# Patient Record
Sex: FEMALE | Race: WHITE | Employment: FULL TIME | URBAN - METROPOLITAN AREA
[De-identification: names, ages, dates, MRNs, and addresses within clinical notes are randomized per-mention and may not be internally consistent; named-entity substitution may affect disease eponyms.]

---

## 2017-01-24 RX ORDER — LEVOTHYROXINE SODIUM 175 UG/1
TABLET ORAL
Qty: 30 TABLET | Refills: 5 | Status: SHIPPED | OUTPATIENT
Start: 2017-01-24 | End: 2017-04-14 | Stop reason: SDUPTHER

## 2017-04-14 RX ORDER — LEVOTHYROXINE SODIUM 175 UG/1
175 TABLET ORAL DAILY
Qty: 90 TABLET | Refills: 0 | Status: SHIPPED | OUTPATIENT
Start: 2017-04-14 | End: 2017-06-12 | Stop reason: SDUPTHER

## 2017-04-24 ENCOUNTER — HOSPITAL ENCOUNTER (OUTPATIENT)
Dept: NUCLEAR MEDICINE | Age: 37
Discharge: HOME | End: 2017-04-24
Attending: NURSE PRACTITIONER | Admitting: NURSE PRACTITIONER

## 2017-04-24 VITALS — BODY MASS INDEX: 40.1 KG/M2 | WEIGHT: 241 LBS

## 2017-04-24 DIAGNOSIS — R10.11 RIGHT UPPER QUADRANT PAIN: ICD-10-CM

## 2017-04-24 DIAGNOSIS — R10.11 ABDOMINAL PAIN, RIGHT UPPER QUADRANT: ICD-10-CM

## 2017-04-24 RX ORDER — 0.9 % SODIUM CHLORIDE 0.9 %
10 VIAL (ML) INJECTION
Status: COMPLETED | OUTPATIENT
Start: 2017-04-24 | End: 2017-04-24

## 2017-04-24 RX ORDER — SODIUM CHLORIDE 9 MG/ML
INJECTION, SOLUTION INTRAVENOUS CONTINUOUS
Status: CANCELLED | OUTPATIENT
Start: 2017-04-24

## 2017-04-24 RX ORDER — SODIUM CHLORIDE 9 MG/ML
INJECTION, SOLUTION INTRAVENOUS CONTINUOUS
Status: DISCONTINUED | OUTPATIENT
Start: 2017-04-24 | End: 2017-04-29 | Stop reason: HOSPADM

## 2017-04-24 RX ADMIN — Medication 6 MILLICURIE: at 10:34

## 2017-04-24 RX ADMIN — SODIUM CHLORIDE: 9 INJECTION, SOLUTION INTRAVENOUS at 12:42

## 2017-04-24 RX ADMIN — Medication 10 ML: at 12:41

## 2017-06-12 RX ORDER — LEVOTHYROXINE SODIUM 175 UG/1
175 TABLET ORAL DAILY
Qty: 90 TABLET | Refills: 3 | Status: SHIPPED | OUTPATIENT
Start: 2017-06-12 | End: 2017-11-13 | Stop reason: SDUPTHER

## 2017-06-29 ENCOUNTER — TELEPHONE (OUTPATIENT)
Dept: ENDOCRINOLOGY | Age: 37
End: 2017-06-29

## 2017-06-29 ENCOUNTER — OFFICE VISIT (OUTPATIENT)
Dept: ENDOCRINOLOGY | Age: 37
End: 2017-06-29

## 2017-06-29 VITALS
SYSTOLIC BLOOD PRESSURE: 120 MMHG | BODY MASS INDEX: 40.39 KG/M2 | RESPIRATION RATE: 16 BRPM | WEIGHT: 242.4 LBS | HEART RATE: 78 BPM | HEIGHT: 65 IN | DIASTOLIC BLOOD PRESSURE: 72 MMHG

## 2017-06-29 DIAGNOSIS — E03.8 OTHER SPECIFIED HYPOTHYROIDISM: Primary | ICD-10-CM

## 2017-06-29 PROCEDURE — 99213 OFFICE O/P EST LOW 20 MIN: CPT | Performed by: INTERNAL MEDICINE

## 2017-11-13 RX ORDER — LEVOTHYROXINE SODIUM 175 UG/1
175 TABLET ORAL DAILY
Qty: 90 TABLET | Refills: 3 | Status: SHIPPED | OUTPATIENT
Start: 2017-11-13 | End: 2018-12-17 | Stop reason: SDUPTHER

## 2017-12-19 ENCOUNTER — OFFICE VISIT (OUTPATIENT)
Dept: DERMATOLOGY | Age: 37
End: 2017-12-19

## 2017-12-19 DIAGNOSIS — D22.5 IRRITATED NEVUS OF BACK: ICD-10-CM

## 2017-12-19 DIAGNOSIS — L81.4 SOLAR LENTIGO: ICD-10-CM

## 2017-12-19 DIAGNOSIS — D22.30 NEVUS OF FACE: Primary | ICD-10-CM

## 2017-12-19 DIAGNOSIS — D23.5 DERMAL NEVUS OF CHEST: ICD-10-CM

## 2017-12-19 PROCEDURE — 99202 OFFICE O/P NEW SF 15 MIN: CPT | Performed by: DERMATOLOGY

## 2017-12-19 NOTE — PROGRESS NOTES
[facility]  Geovanna Mast MD  [phone]      Nnamdi Garrett  1980    40 y.o. female     Date of Visit: 12/19/2017    Chief Complaint: skin moles    History of Present Illness:    1, 2. She presents today for a nevus on the forehead and chest that she would like to have removed. 3.  She also complains of an irritated lesion on the back that she would like to have removed. 4.  She complains of a persistent pigmented lesion on the left cheek. Review of Systems:  Skin: no rash. Past Medical History, Family History, Surgical History, Medications and Allergies reviewed. Past Medical History:   Diagnosis Date    Diabetes mellitus ([address]     gestational diabetic diet controlled    Hypothyroidism 2009    Mental disorder     anxiety     Past Surgical History:   Procedure Laterality Date    WISDOM TOOTH EXTRACTION         No Known Allergies  Outpatient Prescriptions Marked as Taking for the 12/19/17 encounter (Office Visit) with Rosina Curiel MD   Medication Sig Dispense Refill    levothyroxine (SYNTHROID) 175 MCG tablet Take 1 tablet by mouth daily 90 tablet 3         Physical Examination       The following were examined and determined to be normal: Psych/Neuro, Scalp/hair, Head/face, Conjunctivae/eyelids, Gums/teeth/lips, Neck, Breast/axilla/chest, Abdomen, RUE and LUE. The following were examined and determined to be abnormal: Back. Well appearing. 1.  Central upper forehead with a 7 mm round smooth brown papule with a cobblestone surface. 2.  Right upper chest with a 6 mm round smooth brown papule with a cobblestone surface. 3.  Left upper back with a 1 cm oval-shaped pink brown plaque. 4.  Left central cheek with an oval-shaped smooth light brown patch. Assessment and Plan     1. Nevus of face     Return for shave removaldiscussed risk of scar and also out-of-pocket expense.        2. Dermal nevus of chest     Return for shave removaldiscussed risk of scar and also out-of-pocket expense. 3. Irritated nevus of back -      Return for shave removal.  Discussed risk of scar. 4. Solar lentigo     Encouraged sun protective behaviors. Observe.

## 2018-01-19 ENCOUNTER — PROCEDURE VISIT (OUTPATIENT)
Dept: DERMATOLOGY | Age: 38
End: 2018-01-19

## 2018-01-19 DIAGNOSIS — D23.5 DERMAL NEVUS OF CHEST: ICD-10-CM

## 2018-01-19 DIAGNOSIS — D23.30 DERMAL NEVUS OF FACE: Primary | ICD-10-CM

## 2018-01-19 DIAGNOSIS — D22.5 IRRITATED NEVUS OF BACK: ICD-10-CM

## 2018-01-19 PROCEDURE — MISCSHVSIM COSMETIC SHAVE REMOVAL OF BENIGN LESION - SIMPLE: Performed by: DERMATOLOGY

## 2018-01-19 PROCEDURE — 11301 SHAVE SKIN LESION 0.6-1.0 CM: CPT | Performed by: DERMATOLOGY

## 2018-01-19 NOTE — PATIENT INSTRUCTIONS
For your well being, we encourage you to stop smoking or using tobacco products. Smoking and the use of other tobacco products, including cigars and smokeless tobacco, causes or worsens numerous diseases and conditions. Some products also expose nearby people to toxic secondhand smoke. Smoking is the leading cause of preventable death in the [place] causing over 438,000 deaths per year. Secondhand smoke is a serious health hazard for people of all ages, causing more than 41,000 deaths each year. Marijuana smoke contains many of the same toxins, irritants and carcinogens as tobacco smoke. Electronic cigarettes are a new tobacco product, and the potential health consequences and safety of these products are unknown. Smokeless Tobacco products are a known cause of cancer, and are not a safe alternative to cigarettes. 1. Make the decision to quit smoking  Stopping smoking is the best thing you can do for your health. If you smoke, you are more likely to get diseases of the lungs, heart, and brain. You are also more likely to get many kinds of cancer. After you quit, you will be less likely to get these diseases. Stopping smoking is hardbut you can do it! Your doctor can help you. 2. Get ready to quit  Once you decide to quit, make a plan with the help of your doctor. Here are some things you need to do:  Choose a day to quit. Talk to your primary care doctor about using the nicotine patch, gum, inhaler, or nasal spray to help you quit smoking. Getting nicotine some way other than in a cigarette can help make quitting easier. Talk to your primary care doctor about using a prescription medicine like bupropion (brand name: Zyban) to reduce your urge to smoke. If you decide to use a medicine, start taking it two weeks before your quit day. Talk with your primary care doctor about when you smoke. For example, you may smoke first thing in the morning, after a meal, or when you feel stressed.  Plan what you will do instead of smoking. Tell your family and friends that you are going to try to quit and on what date. Put together a list of phone numbers of friends and family members who can give you support when you feel you might break down and have a cigarette. Before your quit day, put all tobacco products, ashtrays, and lighters away. 3. Put your plan into action  When you wake up on your quit day, start using a nicotine replacement method if you had planned to do that. For the first few days after you quit, you may have some signs of nicotine withdrawal. You may feel restless and cranky. You may find it hard to think. You might need to change your dose of nicotine if these signs upset you. Do not smoke! If you feel like you want to smoke, call a friend or family member who has agreed to help you. Also, there might be someone in your doctor's office you can call. Put into action your plans for doing things other than smoking. For example, when you feel the urge to smoke, you might take a walk. Or, you might visit friends who do not smoke. It is best to stay away from places where you used to smoke. 4. If you return to smoking  Quitting smoking is not easy. Many people have to try several times before they succeed. If you start smoking again, call your primary care doctor's office soon to talk about what happened. Think about what you can do to keep from smoking when you try to quit again. Part of this handout is provided by the [employer]. More information is available at the American Lung Association https://romero.com/.  This information provides a general overview and may not apply to everyone. Talk to your primary care doctor to find out if this information applies to you and to get more information on this subject.

## 2018-01-24 ENCOUNTER — TELEPHONE (OUTPATIENT)
Dept: DERMATOLOGY | Age: 38
End: 2018-01-24

## 2018-08-23 ENCOUNTER — OFFICE VISIT (OUTPATIENT)
Dept: ENDOCRINOLOGY | Age: 38
End: 2018-08-23

## 2018-08-23 VITALS
HEIGHT: 64 IN | HEART RATE: 81 BPM | BODY MASS INDEX: 40.39 KG/M2 | RESPIRATION RATE: 16 BRPM | WEIGHT: 236.6 LBS | DIASTOLIC BLOOD PRESSURE: 62 MMHG | SYSTOLIC BLOOD PRESSURE: 108 MMHG | OXYGEN SATURATION: 97 %

## 2018-08-23 DIAGNOSIS — E03.9 ACQUIRED HYPOTHYROIDISM: Primary | ICD-10-CM

## 2018-08-23 DIAGNOSIS — E03.9 ACQUIRED HYPOTHYROIDISM: ICD-10-CM

## 2018-08-23 LAB — TSH REFLEX: 0.66 UIU/ML (ref 0.27–4.2)

## 2018-08-23 PROCEDURE — 99213 OFFICE O/P EST LOW 20 MIN: CPT | Performed by: INTERNAL MEDICINE

## 2018-08-23 RX ORDER — TRAZODONE HYDROCHLORIDE 50 MG/1
TABLET ORAL
COMMUNITY
Start: 2018-06-26

## 2018-08-23 RX ORDER — OMEPRAZOLE 20 MG/1
20 CAPSULE, DELAYED RELEASE ORAL
COMMUNITY

## 2018-12-17 RX ORDER — LEVOTHYROXINE SODIUM 175 UG/1
175 TABLET ORAL DAILY
Qty: 90 TABLET | Refills: 3 | Status: SHIPPED | OUTPATIENT
Start: 2018-12-17

## 2019-04-03 ENCOUNTER — NURSE ONLY (OUTPATIENT)
Dept: CARDIOLOGY CLINIC | Age: 39
End: 2019-04-03
Payer: COMMERCIAL

## 2019-04-03 ENCOUNTER — OFFICE VISIT (OUTPATIENT)
Dept: CARDIOLOGY CLINIC | Age: 39
End: 2019-04-03
Payer: COMMERCIAL

## 2019-04-03 VITALS
HEIGHT: 64 IN | WEIGHT: 230.8 LBS | BODY MASS INDEX: 39.4 KG/M2 | HEART RATE: 84 BPM | SYSTOLIC BLOOD PRESSURE: 115 MMHG | DIASTOLIC BLOOD PRESSURE: 78 MMHG

## 2019-04-03 DIAGNOSIS — R00.2 PALPITATIONS: ICD-10-CM

## 2019-04-03 DIAGNOSIS — R07.89 CHEST TIGHTNESS: ICD-10-CM

## 2019-04-03 DIAGNOSIS — I49.3 PVC (PREMATURE VENTRICULAR CONTRACTION): ICD-10-CM

## 2019-04-03 DIAGNOSIS — I48.92 ATRIAL FLUTTER, UNSPECIFIED TYPE (HCC): ICD-10-CM

## 2019-04-03 DIAGNOSIS — R00.2 PALPITATIONS: Primary | ICD-10-CM

## 2019-04-03 PROCEDURE — 93000 ELECTROCARDIOGRAM COMPLETE: CPT | Performed by: INTERNAL MEDICINE

## 2019-04-03 PROCEDURE — 99203 OFFICE O/P NEW LOW 30 MIN: CPT | Performed by: INTERNAL MEDICINE

## 2019-04-03 RX ORDER — ESCITALOPRAM OXALATE 10 MG/1
10 TABLET ORAL DAILY
COMMUNITY

## 2019-04-03 NOTE — PROGRESS NOTES
[address]   Electrophysiology Consultation   Date: 4/3/2019  Reason for Consultation: Palpitations  Consult Requesting Physician: ANA Carney CNP      Chief Complaint   Patient presents with    Palpitations        HPI: Franki Stock is a 45 y.o. female with a PMH of anxiety, hypothyroidism and DM. She went to [facility] ER for palpitations. Starting March 9th she was in the car driving in the rain and had some chest tightness and noted a pause in her heart rate. When she went to the ED all tests were normal. She wore a 24 hour holter monitor that was again insignificant. She complains of pain in her chest and trouble breathing but does have hx of anxiety. She has had some Chest tightness    Past Medical History:   Diagnosis Date    Diabetes mellitus ([address]     gestational diabetic diet controlled    Hypothyroidism 2009    Mental disorder     anxiety        Past Surgical History:   Procedure Laterality Date    WISDOM TOOTH EXTRACTION         Allergies:  No Known Allergies    Social History:   reports that she has been smoking. She has never used smokeless tobacco. She reports that she drinks alcohol. She reports that she does not use drugs. Family History:  family history includes Cancer in her father; Heart Disease in her paternal grandfather. Reviewed. Denies family history of sudden cardiac death, arrhythmia, premature CAD    Review of System:  All other systems reviewed and are negative except for that noted above.  Pertinent negatives are:   General: negative for fever, chills   Ophthalmic ROS: negative for - eye pain or loss of vision  ENT ROS: negative for - headaches, sore throat   Respiratory: negative for - cough, sputum  Cardiovascular: Reviewed in HPI  Gastrointestinal: negative for - abdominal pain, diarrhea, N/V  Hematology: negative for - bleeding, blood clots, bruising or jaundice  Genito-Urinary:  negative for - Dysuria or incontinence  Musculoskeletal: negative for - Palpitations:   ED [address] visit all testing normal   24 hour holter normal also and without symptoms   Will order a 14 day monitor   Symptoms started about 2 weeks after increasing her dose of Lexapro   May be associated with anxiety     PVC   2122 PVC's in 24 hour. Will f/u    Chest tightness   Atypical   Will monitor    I independently reviewed  holter *  Thank you for allowing me to participate in the care of [address] [place] [address]. Further evaluation will be based upon the patient's clinical course and testing results. All questions and concerns were addressed to the patient/family. Alternatives to my treatment were discussed. I have discussed the above stated plan and the patient verbalized understanding and agreed with the plan. NOTE: This report was transcribed using voice recognition software. Every effort was made to ensure accuracy, however, inadvertent computerized transcription errors may be present. Shaylee Cobian MD, Aline Garvin [address] [place] [address]   Office: [phone]     Scribe attestation:  This note was scribed in the presence of Shaylee Cobian MD by Teresa Sloan RN    I, Dr. Shaylee Cobian personally performed the services described in this documentation as scribed by RN in my presence, and it is both accurate and complete.

## 2019-04-10 ENCOUNTER — TELEPHONE (OUTPATIENT)
Dept: CARDIOLOGY CLINIC | Age: 39
End: 2019-04-10

## 2019-04-10 NOTE — TELEPHONE ENCOUNTER
Would like to have the office visit notes for 4-3-19 with [facility] faxed to their office. Pls call to advise. Thank you.

## 2019-05-06 ENCOUNTER — TELEPHONE (OUTPATIENT)
Dept: CARDIOLOGY CLINIC | Age: 39
End: 2019-05-06

## 2019-05-07 PROCEDURE — 93224 XTRNL ECG REC UP TO 48 HRS: CPT | Performed by: INTERNAL MEDICINE

## 2019-05-08 NOTE — TELEPHONE ENCOUNTER
SR w/PVC 3.5% burden. That's not a large burden and slightly higher but similar to last monitor burden of 2%. Can try lopressor 12.5 to see if this helps w/symptoms.   Follow up 2-3 months

## 2019-05-13 NOTE — TELEPHONE ENCOUNTER
Gave pt message. Received verbal understanding. She wants to talk with her PCP first before starting Metoprolol. She will call back with an answer. HEIDE from pt she does not use her MyChart.